# Patient Record
Sex: FEMALE | Race: WHITE | URBAN - METROPOLITAN AREA
[De-identification: names, ages, dates, MRNs, and addresses within clinical notes are randomized per-mention and may not be internally consistent; named-entity substitution may affect disease eponyms.]

---

## 2017-08-23 ENCOUNTER — OFFICE VISIT (OUTPATIENT)
Dept: OPHTHALMOLOGY | Facility: CLINIC | Age: 62
End: 2017-08-23

## 2017-08-23 DIAGNOSIS — H40.003 GLAUCOMA SUSPECT OF BOTH EYES: Primary | ICD-10-CM

## 2017-08-23 DIAGNOSIS — H25.13 SENILE NUCLEAR SCLEROSIS, BILATERAL: ICD-10-CM

## 2017-08-23 ASSESSMENT — TONOMETRY
OD_IOP_MMHG: 23
OS_IOP_MMHG: 21
IOP_METHOD: APPLANATION

## 2017-08-23 ASSESSMENT — VISUAL ACUITY
OD_SC: 20/25
OD_SC+: +3
OS_PH_SC: 20/20-2
OS_SC: 20/30
METHOD: SNELLEN - LINEAR

## 2017-08-23 ASSESSMENT — EXTERNAL EXAM - RIGHT EYE: OD_EXAM: NORMAL

## 2017-08-23 ASSESSMENT — CUP TO DISC RATIO
OD_RATIO: 0.6
OS_RATIO: 0.5

## 2017-08-23 ASSESSMENT — SLIT LAMP EXAM - LIDS
COMMENTS: NORMAL
COMMENTS: NORMAL

## 2017-08-23 ASSESSMENT — CONF VISUAL FIELD
METHOD: COUNTING FINGERS
OS_NORMAL: 1
OD_NORMAL: 1

## 2017-08-23 ASSESSMENT — EXTERNAL EXAM - LEFT EYE: OS_EXAM: NORMAL

## 2017-08-23 NOTE — MR AVS SNAPSHOT
After Visit Summary   8/23/2017    Albertina Schultz    MRN: [id number]           Patient Information     Date Of Birth          1955        Visit Information        Provider Department      8/23/2017 11:00 AM Diane Fisher MD [facility]        Today's Diagnoses     Glaucoma suspect of both eyes    -  1    Senile nuclear sclerosis, bilateral          Care Instructions    Patient will return to clinic in 8-12 months with repeat IOP check, visual field test, dilated eye exam and OCT with RNFL analysis.  Retinal tear/detachment signs and symptoms discussed with patient.  Patient will return to clinic on an as needed basis and sooner for worsening signs or symptoms.          Follow-ups after your visit        Follow-up notes from your care team     Return 12 months with repeat IOP check, visual field test, dilated eye exam and OCT with RNFL analysis..      Who to contact     Please call your clinic at [phone] to:    Ask questions about your health    Make or cancel appointments    Discuss your medicines    Learn about your test results    Speak to your doctor   If you have compliments or concerns about an experience at your clinic, or if you wish to file a complaint, please contact [facility] Patient Relations at [phone] or email us at [email or web address]         Additional Information About Your Visit        MyChart Information     StuffBufft is an electronic gateway that provides easy, online access to your medical records. With YoQueVos, you can request a clinic appointment, read your test results, renew a prescription or communicate with your care team.     To sign up for StuffBufft visit the website at [email or web address]   You will be asked to enter the access code listed below, as well as some personal information. Please follow the directions to create your username and password.     Your access code is:  [email or web address]  Expires: 10/17/2017  6:30 AM     Your access code will  in 90 days. If you need help or a new code, please contact your [facility] or call [phone] for assistance.        Care EveryWhere ID     This is your Care EveryWhere ID. This could be used by other organizations to access your [place] medical records  [id number]         Blood Pressure from Last 3 Encounters:   14 122/68   10/23/13 110/80   13 130/80    Weight from Last 3 Encounters:   14 80.7 kg (178 lb)   10/23/13 80.3 kg (177 lb)   13 84.8 kg (187 lb)              We Performed the Following     OCT Optic Nerve RNFL Spectralis OU (both eyes)     OVF 24-2 Dynamic OU          Today's Medication Changes          These changes are accurate as of: 17 12:08 PM.  If you have any questions, ask your nurse or doctor.               Stop taking these medicines if you haven't already. Please contact your care team if you have questions.     omeprazole 20 MG CR capsule   Commonly known as:  priLOSEC   Stopped by:  Diane Fisher MD                    Primary Care Provider    None Specified       No primary provider on file.        Equal Access to Services     CYNTHIA ROGERS AH: Hadprincess rodrigeso Sokaruna, waaxda luqadaha, qaybta kaalmada adeegyada, josh tierney. So [facility] [phone].    ATENCIÓN: Si habla español, tiene a crooks disposición servicios gratuitos de asistencia lingüística. Llame al [phone].    We comply with applicable federal civil rights laws and [place] laws. We do not discriminate on the basis of race, color, national origin, age, disability sex, sexual orientation or gender identity.            Thank you!     Thank you for choosing [facility]  for your care. Our goal is always to provide you with excellent care. Hearing back from our patients is one way we can continue to improve our services. Please  take a few minutes to complete the written survey that you may receive in the mail after your visit with us. Thank you!             Your Updated Medication List - Protect others around you: Learn how to safely use, store and throw away your medicines at www.disposemymeds.org.          This list is accurate as of: 8/23/17 12:08 PM.  Always use your most recent med list.                   Brand Name Dispense Instructions for use Diagnosis    ACE NOT PRESCRIBED (INTENTIONAL)     0 each    ACE Inhibitor not prescribed due to blood pressure at goal, no CKD        aspirin 81 MG tablet      Take 1 tablet (81 mg) by mouth daily        atorvastatin 10 MG tablet    LIPITOR    90 tablet    TAKE 1 TABLET BY MOUTH DAILY    Other and unspecified hyperlipidemia       fluticasone 50 MCG/ACT spray    FLONASE     1 spray as needed        IBUPROFEN PO      PRN        losartan 12.5 mg Tabs half-tab    COZAAR     Take 12.5 mg by mouth daily        metFORMIN 500 MG tablet    GLUCOPHAGE     Take 500 mg by mouth 2 times daily (with meals)

## 2017-08-23 NOTE — NURSING NOTE
Chief Complaints and History of Present Illnesses   Patient presents with     Glaucoma Suspect Follow Up     HPI    Affected eye(s):  Both   Symptoms:     No decreased vision   No flashes   No glare   No halos   No foreign body sensation   No eye discharge      Frequency:  Constant       Do you have eye pain now?:  No      Comments:  VA fluctuates.  Candice SHARIF 10:57 AM 08/23/2017

## 2017-08-23 NOTE — PROGRESS NOTES
1)Glc Suspect -- K pachy: 542/540   Tmax: L20s per patient    HVF:  Full c fluct   CDR: 0.6/0.5    HRT/OCT:RNFL WNL        FHX of Glc: Mother -- possibly was on gtts, Sister -- ?surgery for glc by Dr. Garrett, Brother -- OHT was taken off drops, Cousin -- OHT was taken off drops      Gonio: open      Intolerant to:      Asthma/COPD:No   Steroid Use: Yes, in remote past    Kidney Stones: No    Sulfa Allergy:  No    IOP targets:L20s -- IOP good   2)DM  s DR  3)NS OU  4)PVD OS    Patient will return to clinic in 8-12 months with repeat IOP check, visual field test, dilated eye exam and OCT with RNFL analysis.  Retinal tear/detachment signs and symptoms discussed with patient.  Patient will return to clinic on an as needed basis and sooner for worsening signs or symptoms.    Attending Physician Attestation:  Complete documentation of historical and exam elements from today's encounter can be found in the full encounter summary report (not reduplicated in this progress note). I personally obtained the chief complaint(s) and history of present illness.  I confirmed and edited as necessary the review of systems, past medical/surgical history, family history, social history, and examination findings as documented by others; and I examined the patient myself. I personally reviewed the relevant tests, images, and reports as documented above. I formulated and edited as necessary the assessment and plan and discussed the findings and management plan with the patient and family.  - Diane Fisher MD

## 2017-08-23 NOTE — PATIENT INSTRUCTIONS
Patient will return to clinic in 8-12 months with repeat IOP check, visual field test, dilated eye exam and OCT with RNFL analysis.  Retinal tear/detachment signs and symptoms discussed with patient.  Patient will return to clinic on an as needed basis and sooner for worsening signs or symptoms.

## 2018-08-24 ENCOUNTER — OFFICE VISIT (OUTPATIENT)
Dept: OPHTHALMOLOGY | Facility: CLINIC | Age: 63
End: 2018-08-24
Payer: COMMERCIAL

## 2018-08-24 DIAGNOSIS — H40.003 GLAUCOMA SUSPECT OF BOTH EYES: Primary | ICD-10-CM

## 2018-08-24 DIAGNOSIS — H25.13 NUCLEAR SENILE CATARACT OF BOTH EYES: ICD-10-CM

## 2018-08-24 ASSESSMENT — SLIT LAMP EXAM - LIDS
COMMENTS: NORMAL
COMMENTS: NORMAL

## 2018-08-24 ASSESSMENT — TONOMETRY
IOP_METHOD: APPLANATION
OS_IOP_MMHG: 20
IOP_METHOD: APPLANATION
OD_IOP_MMHG: 24
OD_IOP_MMHG: 24
OS_IOP_MMHG: 16

## 2018-08-24 ASSESSMENT — VISUAL ACUITY
OS_SC+: +2
OD_SC: 20/20
OD_SC+: -2
OS_SC: 20/25
METHOD: SNELLEN - LINEAR

## 2018-08-24 ASSESSMENT — CUP TO DISC RATIO
OD_RATIO: 0.6
OS_RATIO: 0.5

## 2018-08-24 ASSESSMENT — CONF VISUAL FIELD
OS_NORMAL: 1
METHOD: COUNTING FINGERS
OD_NORMAL: 1

## 2018-08-24 ASSESSMENT — EXTERNAL EXAM - LEFT EYE: OS_EXAM: NORMAL

## 2018-08-24 ASSESSMENT — EXTERNAL EXAM - RIGHT EYE: OD_EXAM: NORMAL

## 2018-08-24 NOTE — MR AVS SNAPSHOT
After Visit Summary   2018    Albertina Schultz    MRN: [id number]           Patient Information     Date Of Birth          1955        Visit Information        Provider Department      2018 9:30 AM Diane Fisher MD [facility]        Today's Diagnoses     Glaucoma suspect of both eyes    -  1    Nuclear senile cataract of both eyes          Care Instructions    Patient will return to clinic in 8-12 months with repeat IOP check, visual field test, dilated eye exam and OCT with RNFL analysis.  Retinal tear/detachment signs and symptoms discussed with patient.  Patient will return to clinic on an as needed basis and sooner for worsening signs or symptoms.          Follow-ups after your visit        Follow-up notes from your care team     Return  8-12 months with repeat IOP check, visual field test, dilated eye exam and OCT with RNFL analysis. .      Who to contact     Please call your clinic at [phone] to:    Ask questions about your health    Make or cancel appointments    Discuss your medicines    Learn about your test results    Speak to your doctor            Additional Information About Your Visit        MyChart Information     SocialEars is an electronic gateway that provides easy, online access to your medical records. With SocialEars, you can request a clinic appointment, read your test results, renew a prescription or communicate with your care team.     To sign up for Social Media Simplifiedt visit the website at [email or web address]   You will be asked to enter the access code listed below, as well as some personal information. Please follow the directions to create your username and password.     Your access code is: [email or web address]  Expires: 2018  6:30 AM     Your access code will  in 90 days. If you need help or a new code, please contact your [facility] or call [phone] for assistance.        Care EveryWhere ID      This is your Care EveryWhere ID. This could be used by other organizations to access your [place] medical records  [id number]         Blood Pressure from Last 3 Encounters:   07/08/14 122/68   10/23/13 110/80   04/09/13 130/80    Weight from Last 3 Encounters:   07/08/14 80.7 kg (178 lb)   10/23/13 80.3 kg (177 lb)   04/09/13 84.8 kg (187 lb)              We Performed the Following     OCT Optic Nerve RNFL Spectralis OU (both eyes)     OVF 24-2 Dynamic OU        Primary Care Provider Fax #    Physician No Ref-Primary [phone]       No address on file        Equal Access to Services     CYNTHIA ROGERS : Rob Magana, wamala riojas, tom chavezalmaemiliano william, josh tierney. So [facility] [phone].    ATENCIÓN: Si habla español, tiene a crooks disposición servicios gratuitos de asistencia lingüística. Llame al [phone].    We comply with applicable federal civil rights laws and [place] laws. We do not discriminate on the basis of race, color, national origin, age, disability, sex, sexual orientation, or gender identity.            Thank you!     Thank you for choosing [facility]  for your care. Our goal is always to provide you with excellent care. Hearing back from our patients is one way we can continue to improve our services. Please take a few minutes to complete the written survey that you may receive in the mail after your visit with us. Thank you!             Your Updated Medication List - Protect others around you: Learn how to safely use, store and throw away your medicines at www.disposemymeds.org.          This list is accurate as of 8/24/18 11:15 AM.  Always use your most recent med list.                   Brand Name Dispense Instructions for use Diagnosis    ACE NOT PRESCRIBED (INTENTIONAL)     0 each    ACE Inhibitor not prescribed due to blood pressure at goal, no CKD        aspirin 81 MG tablet      Take 1 tablet (81  mg) by mouth daily        atorvastatin 10 MG tablet    LIPITOR    90 tablet    TAKE 1 TABLET BY MOUTH DAILY    Other and unspecified hyperlipidemia       fluticasone 50 MCG/ACT spray    FLONASE     1 spray as needed        IBUPROFEN PO      PRN        losartan 12.5 mg Tabs half-tab    COZAAR     Take 12.5 mg by mouth daily        metFORMIN 500 MG tablet    GLUCOPHAGE     Take 500 mg by mouth 2 times daily (with meals)        MULTIVITAL Tabs      Take 1 tablet by mouth daily